# Patient Record
Sex: MALE | Race: OTHER | HISPANIC OR LATINO | URBAN - METROPOLITAN AREA
[De-identification: names, ages, dates, MRNs, and addresses within clinical notes are randomized per-mention and may not be internally consistent; named-entity substitution may affect disease eponyms.]

---

## 2017-10-23 ENCOUNTER — EMERGENCY (EMERGENCY)
Facility: HOSPITAL | Age: 32
LOS: 1 days | Discharge: HOME | End: 2017-10-23
Attending: EMERGENCY MEDICINE | Admitting: EMERGENCY MEDICINE
Payer: COMMERCIAL

## 2017-10-23 VITALS
TEMPERATURE: 98 F | OXYGEN SATURATION: 99 % | DIASTOLIC BLOOD PRESSURE: 81 MMHG | RESPIRATION RATE: 16 BRPM | HEART RATE: 94 BPM | SYSTOLIC BLOOD PRESSURE: 136 MMHG

## 2017-10-23 VITALS
RESPIRATION RATE: 18 BRPM | SYSTOLIC BLOOD PRESSURE: 124 MMHG | DIASTOLIC BLOOD PRESSURE: 68 MMHG | OXYGEN SATURATION: 97 % | TEMPERATURE: 98 F | HEART RATE: 72 BPM

## 2017-10-23 DIAGNOSIS — R53.83 OTHER FATIGUE: ICD-10-CM

## 2017-10-23 DIAGNOSIS — J02.9 ACUTE PHARYNGITIS, UNSPECIFIED: ICD-10-CM

## 2017-10-23 DIAGNOSIS — J06.9 ACUTE UPPER RESPIRATORY INFECTION, UNSPECIFIED: ICD-10-CM

## 2017-10-23 LAB
RAPID RVP RESULT: DETECTED
RV+EV RNA SPEC QL NAA+PROBE: DETECTED

## 2017-10-23 PROCEDURE — 93005 ELECTROCARDIOGRAM TRACING: CPT

## 2017-10-23 PROCEDURE — 99283 EMERGENCY DEPT VISIT LOW MDM: CPT | Mod: 25

## 2017-10-23 PROCEDURE — 87486 CHLMYD PNEUM DNA AMP PROBE: CPT

## 2017-10-23 PROCEDURE — 87798 DETECT AGENT NOS DNA AMP: CPT

## 2017-10-23 PROCEDURE — 87633 RESP VIRUS 12-25 TARGETS: CPT

## 2017-10-23 PROCEDURE — 93010 ELECTROCARDIOGRAM REPORT: CPT

## 2017-10-23 PROCEDURE — 87581 M.PNEUMON DNA AMP PROBE: CPT

## 2017-10-23 PROCEDURE — 99284 EMERGENCY DEPT VISIT MOD MDM: CPT | Mod: 25

## 2017-10-23 RX ORDER — IBUPROFEN 200 MG
600 TABLET ORAL ONCE
Qty: 0 | Refills: 0 | Status: COMPLETED | OUTPATIENT
Start: 2017-10-23 | End: 2017-10-23

## 2017-10-23 RX ADMIN — Medication 600 MILLIGRAM(S): at 18:53

## 2017-10-23 RX ADMIN — Medication 600 MILLIGRAM(S): at 17:36

## 2017-10-23 NOTE — ED ADULT TRIAGE NOTE - CHIEF COMPLAINT QUOTE
" last time I had flu type B I felt exactly like this". Pt c/o fatigue, dry throat, cough, chest tightness reports taking Tylenol for body aches today, denies fever.

## 2017-10-23 NOTE — ED PROVIDER NOTE - OBJECTIVE STATEMENT
33 y/o m with no significant PMH presents to ED with congestion, sore throat, myalgia, fatigue.  Denies fever, chills.  Hx of flu B year prior.  Has been taking over-the-counter meds.  Denies other symptoms.

## 2017-10-23 NOTE — ED ADULT NURSE NOTE - CHPI ED SYMPTOMS NEG
no tingling/no fever/no vomiting/no numbness/no decreased eating/drinking/no dizziness/no chills/no weakness

## 2017-10-23 NOTE — ED PROVIDER NOTE - MEDICAL DECISION MAKING DETAILS
pt with congestion, cough, fatigue, myalgia x 1 day - pe - mild pharynx erythema, lungs cta b/l, pt otherwise appears well, viral panel sent, supportive care with ibuprofen

## 2018-12-05 NOTE — ED ADULT NURSE NOTE - GASTROINTESTINAL WDL
Telephone Encounter by Dilcia Del Castillo at 01/25/59 05:04 PM     Author:  Yoselyn Cordova CMA Service:  (none) Author Type:  Certified Medical Assistant     Filed:  04/17/18 05:04 PM Encounter Date:  4/16/2018 Status:  Signed     :  Yoselyn Cordova, [employer] (Certified Medical Assistant)            Medication refilled per Dr. Radha Patrick and faxed to pharmacy. [FL1.1T]        Revision History        User Key Date/Time User Provider Type Action    > FL1.1 04/17/18 05:04 PM Hayde Rodriguez, [employer] Certified Medical Assistant Sign    T - Template Abdomen soft, nontender, nondistended, bowel sounds present in all 4 quadrants.